# Patient Record
Sex: FEMALE | Race: OTHER
[De-identification: names, ages, dates, MRNs, and addresses within clinical notes are randomized per-mention and may not be internally consistent; named-entity substitution may affect disease eponyms.]

---

## 2018-08-16 ENCOUNTER — HOSPITAL ENCOUNTER (INPATIENT)
Dept: HOSPITAL 72 - EMR | Age: 83
LOS: 4 days | Discharge: HOME | DRG: 917 | End: 2018-08-20
Payer: COMMERCIAL

## 2018-08-16 VITALS — SYSTOLIC BLOOD PRESSURE: 143 MMHG | DIASTOLIC BLOOD PRESSURE: 87 MMHG

## 2018-08-16 VITALS — HEIGHT: 62 IN | WEIGHT: 139 LBS | BODY MASS INDEX: 25.58 KG/M2

## 2018-08-16 DIAGNOSIS — I45.10: ICD-10-CM

## 2018-08-16 DIAGNOSIS — E83.42: ICD-10-CM

## 2018-08-16 DIAGNOSIS — E87.6: ICD-10-CM

## 2018-08-16 DIAGNOSIS — T42.6X1A: Primary | ICD-10-CM

## 2018-08-16 DIAGNOSIS — Z60.2: ICD-10-CM

## 2018-08-16 DIAGNOSIS — F51.04: ICD-10-CM

## 2018-08-16 DIAGNOSIS — F32.9: ICD-10-CM

## 2018-08-16 DIAGNOSIS — E87.1: ICD-10-CM

## 2018-08-16 DIAGNOSIS — I10: ICD-10-CM

## 2018-08-16 DIAGNOSIS — E03.9: ICD-10-CM
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
## 2018-08-16 DIAGNOSIS — F41.0: ICD-10-CM

## 2018-08-16 DIAGNOSIS — G92: ICD-10-CM

## 2018-08-16 LAB
ADD MANUAL DIFF: NO
ALBUMIN SERPL-MCNC: 3.7 G/DL (ref 3.4–5)
ALBUMIN/GLOB SERPL: 1.2 {RATIO} (ref 1–2.7)
ALP SERPL-CCNC: 63 U/L (ref 46–116)
ALT SERPL-CCNC: 20 U/L (ref 12–78)
AMMONIA PLAS-SCNC: 18 UMOL/L (ref 11–32)
ANION GAP SERPL CALC-SCNC: 8 MMOL/L (ref 5–15)
AST SERPL-CCNC: 14 U/L (ref 15–37)
BASOPHILS NFR BLD AUTO: 1.4 % (ref 0–2)
BILIRUB SERPL-MCNC: 0.6 MG/DL (ref 0.2–1)
BUN SERPL-MCNC: 15 MG/DL (ref 7–18)
CALCIUM SERPL-MCNC: 9.6 MG/DL (ref 8.5–10.1)
CHLORIDE SERPL-SCNC: 95 MMOL/L (ref 98–107)
CO2 SERPL-SCNC: 28 MMOL/L (ref 21–32)
CREAT SERPL-MCNC: 0.7 MG/DL (ref 0.55–1.3)
EOSINOPHIL NFR BLD AUTO: 1 % (ref 0–3)
ERYTHROCYTE [DISTWIDTH] IN BLOOD BY AUTOMATED COUNT: 12.3 % (ref 11.6–14.8)
GLOBULIN SER-MCNC: 3.2 G/DL
HCT VFR BLD CALC: 38.1 % (ref 37–47)
HGB BLD-MCNC: 13 G/DL (ref 12–16)
LYMPHOCYTES NFR BLD AUTO: 24.1 % (ref 20–45)
MCV RBC AUTO: 87 FL (ref 80–99)
MONOCYTES NFR BLD AUTO: 8.9 % (ref 1–10)
NEUTROPHILS NFR BLD AUTO: 64.6 % (ref 45–75)
PLATELET # BLD: 207 K/UL (ref 150–450)
POTASSIUM SERPL-SCNC: 2.6 MMOL/L (ref 3.5–5.1)
RBC # BLD AUTO: 4.39 M/UL (ref 4.2–5.4)
SODIUM SERPL-SCNC: 131 MMOL/L (ref 136–145)
WBC # BLD AUTO: 5.6 K/UL (ref 4.8–10.8)

## 2018-08-16 PROCEDURE — 80329 ANALGESICS NON-OPIOID 1 OR 2: CPT

## 2018-08-16 PROCEDURE — 71045 X-RAY EXAM CHEST 1 VIEW: CPT

## 2018-08-16 PROCEDURE — 80307 DRUG TEST PRSMV CHEM ANLYZR: CPT

## 2018-08-16 PROCEDURE — 84443 ASSAY THYROID STIM HORMONE: CPT

## 2018-08-16 PROCEDURE — 81003 URINALYSIS AUTO W/O SCOPE: CPT

## 2018-08-16 PROCEDURE — 70450 CT HEAD/BRAIN W/O DYE: CPT

## 2018-08-16 PROCEDURE — 93971 EXTREMITY STUDY: CPT

## 2018-08-16 PROCEDURE — 80053 COMPREHEN METABOLIC PANEL: CPT

## 2018-08-16 PROCEDURE — 96360 HYDRATION IV INFUSION INIT: CPT

## 2018-08-16 PROCEDURE — 82140 ASSAY OF AMMONIA: CPT

## 2018-08-16 PROCEDURE — 85025 COMPLETE CBC W/AUTO DIFF WBC: CPT

## 2018-08-16 PROCEDURE — 84484 ASSAY OF TROPONIN QUANT: CPT

## 2018-08-16 PROCEDURE — 83605 ASSAY OF LACTIC ACID: CPT

## 2018-08-16 PROCEDURE — 80048 BASIC METABOLIC PNL TOTAL CA: CPT

## 2018-08-16 PROCEDURE — 93005 ELECTROCARDIOGRAM TRACING: CPT

## 2018-08-16 PROCEDURE — 83735 ASSAY OF MAGNESIUM: CPT

## 2018-08-16 PROCEDURE — 36415 COLL VENOUS BLD VENIPUNCTURE: CPT

## 2018-08-16 SDOH — SOCIAL STABILITY - SOCIAL INSECURITY: PROBLEMS RELATED TO LIVING ALONE: Z60.2

## 2018-08-16 NOTE — EMERGENCY ROOM REPORT
History of Present Illness


General


Source:  Family Member, EMS





Present Illness


HPI


Patient is an 89-year-old female brought in by [facility] after 

increased altered mental status.  Patient had been reportedly had an argument 

with her son and may have taken some sleeping pills.  Patient prior history of 

hypothyroidism as well as hypertension.  Patient was noted to have increased 

altered mental status.Patient reportedly had been at a doctor's appointment 

earlier in the day.The patient was noted to be responsive to painful stimuli by 

EMS.


Allergies:  


Coded Allergies:  


     CODEINE (Verified  Allergy, Unknown, 10/30/09)





Patient History


Reviewed Nursing Documentation:  PMH: Agreed; PSxH: Agreed





Review of Systems


All Other Systems:  limited - by mental status





Physical Exam


General Appearance:  moderate distress, lethargic


Head:  normocephalic


Eyes:  bilateral eye other - pupils 2mm reactive


ENT:  dry mucus membranes


Neck:  limited range of motion


Respiratory:  rales


Cardiovascular #1:  tachycardia


Gastrointestinal:  normal inspection, soft


Musculoskeletal:  normal inspection, back normal


Neurologic:  other - somnolent, purposeful movement when checking gag reflex





Medical Decision Making


Diagnostic Impression:  


 Primary Impression:  


 Altered level of consciousness


 Additional Impressions:  


 Incomplete right bundle branch block


 Medication overdose


ER Course


Patient presented for altered mental status.  Differential diagnosis included 

wasn't limited to CVA, medication overdose, sepsis, hypertensive crisis, 

encephalopathy among others.Because of complexity of patient's case laboratory 

testing and imaging studies were ordered.A CT imaging of the head read by 

radiology showed no evidence of acute hemorrhage or CVA.EKG interpreted by me 

showed normal sinus rhythm with a rate of 75 with left axis deviation right 

bundle-branch block without acute ST changes.  The patient started on IV 

fluids.  Chest x-ray one view interpreted by me showed tortuous aorta without 

evident infiltrate normal cardiac size and probable healed left proximal 

humerus fracture.  


The patient was noted to have some gradual improvement in mental status.  

Poison control was contacted for possible overdose.  They recommended 

laboratory testing as well as IV bicarbonate.  Dr. Chicas was contacted for 

inpatient management due to panel physician.





Labs








Test


  8/16/18


22:40 8/17/18


01:50


 


White Blood Count


  5.6 K/UL


(4.8-10.8) 


 


 


Red Blood Count


  4.39 M/UL


(4.20-5.40) 


 


 


Hemoglobin


  13.0 G/DL


(12.0-16.0) 


 


 


Hematocrit


  38.1 %


(37.0-47.0) 


 


 


Mean Corpuscular Volume 87 FL (80-99)  


 


Mean Corpuscular Hemoglobin


  29.7 PG


(27.0-31.0) 


 


 


Mean Corpuscular Hemoglobin


Concent 34.2 G/DL


(32.0-36.0) 


 


 


Red Cell Distribution Width


  12.3 %


(11.6-14.8) 


 


 


Platelet Count


  207 K/UL


(150-450) 


 


 


Mean Platelet Volume


  8.9 FL


(6.5-10.1) 


 


 


Neutrophils (%) (Auto)


  64.6 %


(45.0-75.0) 


 


 


Lymphocytes (%) (Auto)


  24.1 %


(20.0-45.0) 


 


 


Monocytes (%) (Auto)


  8.9 %


(1.0-10.0) 


 


 


Eosinophils (%) (Auto)


  1.0 %


(0.0-3.0) 


 


 


Basophils (%) (Auto)


  1.4 %


(0.0-2.0) 


 


 


Sodium Level


  131 MMOL/L


(136-145) 


 


 


Potassium Level


  2.6 MMOL/L


(3.5-5.1) 


 


 


Chloride Level


  95 MMOL/L


() 


 


 


Carbon Dioxide Level


  28 MMOL/L


(21-32) 


 


 


Anion Gap


  8 mmol/L


(5-15) 


 


 


Blood Urea Nitrogen


  15 mg/dL


(7-18) 


 


 


Creatinine


  0.7 MG/DL


(0.55-1.30) 


 


 


Estimat Glomerular Filtration


Rate  mL/min (>60) 


  


 


 


Glucose Level


  116 MG/DL


() 


 


 


Lactic Acid Level


  0.90 mmol/L


(0.4-2.0) 


 


 


Calcium Level


  9.6 MG/DL


(8.5-10.1) 


 


 


Total Bilirubin


  0.6 MG/DL


(0.2-1.0) 


 


 


Aspartate Amino Transf


(AST/SGOT) 14 U/L (15-37) 


  


 


 


Alanine Aminotransferase


(ALT/SGPT) 20 U/L (12-78) 


  


 


 


Alkaline Phosphatase


  63 U/L


() 


 


 


Ammonia


  18 umol/L


(11-32) 


 


 


Troponin I


  0.003 ng/mL


(0.000-0.056) 


 


 


Total Protein


  6.9 G/DL


(6.4-8.2) 


 


 


Albumin


  3.7 G/DL


(3.4-5.0) 


 


 


Globulin 3.2 g/dL  


 


Albumin/Globulin Ratio 1.2 (1.0-2.7)  


 


Thyroid Stimulating Hormone


(TSH) 4.465 uiU/mL


(0.358-3.740) 


 


 


Salicylates Level


  0.7 ug/mL


(2.8-20) 


 


 


Acetaminophen Level


  < 2 MCG/ML


(10-30) 


 


 


Serum Alcohol < 3 mg/dL  


 


Urine Color  Pale yellow 


 


Urine Appearance  Clear 


 


Urine pH  7 (4.5-8.0) 


 


Urine Specific Gravity


  


  1.010


(1.005-1.035)


 


Urine Protein


  


  Negative


(NEGATIVE)


 


Urine Glucose (UA)


  


  Negative


(NEGATIVE)


 


Urine Ketones  1+ (NEGATIVE) 


 


Urine Occult Blood  1+ (NEGATIVE) 


 


Urine Nitrite


  


  Negative


(NEGATIVE)


 


Urine Bilirubin


  


  Negative


(NEGATIVE)


 


Urine Urobilinogen


  


  Normal MG/DL


(0.0-1.0)


 


Urine Leukocyte Esterase


  


  Negative


(NEGATIVE)


 


Urine RBC


  


  0-2 /HPF (0 -


2)


 


Urine WBC  0 /HPF (0 - 2) 


 


Urine Squamous Epithelial


Cells 


  Few /LPF


(NONE/OCC)


 


Urine Bacteria


  


  None /HPF


(NONE)


 


Urine Opiates Screen


  


  Negative


(NEGATIVE)


 


Urine Barbiturates Screen


  


  Negative


(NEGATIVE)


 


Phencyclidine (PCP) Screen


  


  Negative


(NEGATIVE)


 


Urine Amphetamines Screen


  


  Negative


(NEGATIVE)


 


Urine Benzodiazepines Screen


  


  Negative


(NEGATIVE)


 


Urine Cocaine Screen


  


  Negative


(NEGATIVE)


 


Urine Marijuana (THC) Screen


  


  Negative


(NEGATIVE)








EKG Diagnostic Results


Rate:  normal


Rhythm:  NSR


ST Segments:  no acute changes


Status:  unchanged


Disposition:  ADMITTED AS INPATIENT


Condition:  John Sosa MD Aug 16, 2018 22:22

## 2018-08-17 VITALS — DIASTOLIC BLOOD PRESSURE: 73 MMHG | SYSTOLIC BLOOD PRESSURE: 154 MMHG

## 2018-08-17 VITALS — SYSTOLIC BLOOD PRESSURE: 123 MMHG | DIASTOLIC BLOOD PRESSURE: 75 MMHG

## 2018-08-17 VITALS — DIASTOLIC BLOOD PRESSURE: 67 MMHG | SYSTOLIC BLOOD PRESSURE: 130 MMHG

## 2018-08-17 VITALS — DIASTOLIC BLOOD PRESSURE: 77 MMHG | SYSTOLIC BLOOD PRESSURE: 136 MMHG

## 2018-08-17 VITALS — SYSTOLIC BLOOD PRESSURE: 173 MMHG | DIASTOLIC BLOOD PRESSURE: 90 MMHG

## 2018-08-17 VITALS — DIASTOLIC BLOOD PRESSURE: 108 MMHG | SYSTOLIC BLOOD PRESSURE: 142 MMHG

## 2018-08-17 VITALS — DIASTOLIC BLOOD PRESSURE: 84 MMHG | SYSTOLIC BLOOD PRESSURE: 144 MMHG

## 2018-08-17 VITALS — DIASTOLIC BLOOD PRESSURE: 111 MMHG | SYSTOLIC BLOOD PRESSURE: 187 MMHG

## 2018-08-17 VITALS — SYSTOLIC BLOOD PRESSURE: 147 MMHG | DIASTOLIC BLOOD PRESSURE: 61 MMHG

## 2018-08-17 VITALS — SYSTOLIC BLOOD PRESSURE: 143 MMHG | DIASTOLIC BLOOD PRESSURE: 80 MMHG

## 2018-08-17 LAB
ADD MANUAL DIFF: NO
ALBUMIN SERPL-MCNC: 3.5 G/DL (ref 3.4–5)
ALBUMIN/GLOB SERPL: 1 {RATIO} (ref 1–2.7)
ALP SERPL-CCNC: 62 U/L (ref 46–116)
ALT SERPL-CCNC: 13 U/L (ref 12–78)
ANION GAP SERPL CALC-SCNC: 6 MMOL/L (ref 5–15)
APPEARANCE UR: CLEAR
APTT PPP: (no result) S
AST SERPL-CCNC: 18 U/L (ref 15–37)
BASOPHILS NFR BLD AUTO: 0.8 % (ref 0–2)
BILIRUB SERPL-MCNC: 0.7 MG/DL (ref 0.2–1)
BUN SERPL-MCNC: 14 MG/DL (ref 7–18)
CALCIUM SERPL-MCNC: 8.6 MG/DL (ref 8.5–10.1)
CHLORIDE SERPL-SCNC: 96 MMOL/L (ref 98–107)
CO2 SERPL-SCNC: 28 MMOL/L (ref 21–32)
CREAT SERPL-MCNC: 0.7 MG/DL (ref 0.55–1.3)
EOSINOPHIL NFR BLD AUTO: 0.3 % (ref 0–3)
ERYTHROCYTE [DISTWIDTH] IN BLOOD BY AUTOMATED COUNT: 11.9 % (ref 11.6–14.8)
GLOBULIN SER-MCNC: 3.5 G/DL
GLUCOSE UR STRIP-MCNC: NEGATIVE MG/DL
HCT VFR BLD CALC: 41.4 % (ref 37–47)
HGB BLD-MCNC: 13.9 G/DL (ref 12–16)
KETONES UR QL STRIP: (no result)
LEUKOCYTE ESTERASE UR QL STRIP: NEGATIVE
LYMPHOCYTES NFR BLD AUTO: 10.6 % (ref 20–45)
MCV RBC AUTO: 86 FL (ref 80–99)
MONOCYTES NFR BLD AUTO: 6.8 % (ref 1–10)
NEUTROPHILS NFR BLD AUTO: 81.6 % (ref 45–75)
NITRITE UR QL STRIP: NEGATIVE
PH UR STRIP: 7 [PH] (ref 4.5–8)
PLATELET # BLD: 201 K/UL (ref 150–450)
POTASSIUM SERPL-SCNC: 4 MMOL/L (ref 3.5–5.1)
PROT UR QL STRIP: NEGATIVE
RBC # BLD AUTO: 4.82 M/UL (ref 4.2–5.4)
SODIUM SERPL-SCNC: 130 MMOL/L (ref 136–145)
SP GR UR STRIP: 1.01 (ref 1–1.03)
UROBILINOGEN UR-MCNC: NORMAL MG/DL (ref 0–1)
WBC # BLD AUTO: 9 K/UL (ref 4.8–10.8)

## 2018-08-17 NOTE — DIAGNOSTIC IMAGING REPORT
Indications: Altered mental status

 

Technique: Spiral acquisitions obtained through the brain. Angled axial and coronal 5

x 5 mm slices were reconstructed. Total dose length product 1428.87 mGycm.  CTDI

vol(s) 70.38 mGy. Dose reduction achieved using automated exposure control

 

Comparison: None.

 

Findings: There is some image degradation due to motion artifact. No acute

intracranial hemorrhage or edema. No mass effect nor midline shift. There is

age-related enlargement of the ventricles and extra-axial CSF spaces. There is

periventricular deep white matter low-attenuation. Old lacunar infarct is seen in the

right frontal deep white matter. There is evidence of prior bilateral cataract

surgery. There is minimal mastoid disease on the left. The calvarium is intact. The

sinuses are clear

 

Impression: Somewhat limited exam due to motion artifact

 

Chronic and age-related changes

 

Negative for acute intracranial bleed or mass effect

 

Minimal mastoid disease

 

This agrees with the preliminary interpretation provided overnight by [facility]

teleradiology service.

 

The CT scanner at [facility] is accredited by the [employer] College of

Radiology and the scans are performed using protocols designed to limit radiation

exposure to as low as reasonably achievable to attain images of sufficient resolution

adequate for diagnostic evaluation.

## 2018-08-17 NOTE — DIAGNOSTIC IMAGING REPORT
Indication: Shortness of breath

 

Technique: One view of the chest

 

Comparison: none

 

Findings: There is old healed fracture deformity left humerus. The lungs and pleural

spaces are clear. The heart size is upper limits normal. The aorta is tortuous

ectatic and calcified. There are surgical clips in the right upper quadrant. There is

evidence of prior vertebral augmentation procedure

 

Impression: No acute process

## 2018-08-17 NOTE — CONSULTATION
History of Present Illness


General


Date patient seen:  Aug 17, 2018


Chief Complaint:  Altered Level of Consciousness





Present Illness


HPI


89 y/o w/ HTN, depression, wet macular degeneration, chronic insomnia admitted 

with altered mental status and apparently took more than one ambien for 

possible suicidal ideation.  Noted electrolyte abnormalities but currently 

alert and comfortable.  Depressed about her overall health and condition.  No 

chest pain or shortness of breath.  No fever or chills.  Still w/ suicidal 

ideation per her discussion with others.


Allergies:  


Coded Allergies:  


     CODEINE (Verified  Allergy, Unknown, 10/30/09)





Medication History


Scheduled


Aspirin* (Aspir 81*), 81 MG ORAL DAILY, (Reported)





Miscellaneous Medications


Hydrochlorothiazide* (Hydrochlorothiazide*), MG ORAL, (Reported)


Levothyroxine Sodium* (Levothyroxine Sodium*), MCG ORAL, (Reported)


Losartan Potassium* (Losartan Potassium*), MG ORAL, (Reported)


Propranolol Hcl (Propranolol Hcl), MG PO, (Reported)


Simvastatin (Zocor), MG ORAL, (Reported)


Zolpidem Tartrate* (Zolpidem Tartrate*), MG ORAL, (Reported)





Patient History


History Provided By:  Patient


Healthcare decision maker





Resuscitation status


Full Code


Advanced Directive on File








Past Medical/Surgical History


Past Medical/Surgical History:  


(1) Hypertension


(2) Hypothyroidism





Review of Systems


Constitutional:  Reports: no symptoms


Eye:  Reports: other - wet macular degeneration


ENT:  Reports: no symptoms


Respiratory:  Reports: no symptoms


Cardiovascular:  Reports: no symptoms


Gastrointestinal:  Reports: no symptoms


Musculoskeletal:  Reports: no symptoms


Psychiatric:  Reports: depressed feelings


Neurological:  Reports: no symptoms





Physical Exam


General Appearance:  WD/WN, no apparent distress, alert


HEENT:  normocephalic, atraumatic, anicteric


Neck:  supple, normal inspection


Respiratory/Chest:  lungs clear


Cardiovascular/Chest:  normal rate, regular rhythm


Abdomen:  normal bowel sounds, non tender, soft


Extremities:  no edema


Neurologic:  CNs II-XII grossly normal, no motor/sensory deficits, alert, 

oriented x 3, responsive





Last 24 Hour Vital Signs








  Date Time  Temp Pulse Resp B/P (MAP) Pulse Ox O2 Delivery O2 Flow Rate FiO2


 


8/17/18 13:39    130/67    


 


8/17/18 12:00  80      


 


8/17/18 12:00      Room Air  


 


8/17/18 12:00 97.5 73 18 130/67 (88) 96   





 97.5       


 


8/17/18 08:03  81      


 


8/17/18 08:00      Room Air  


 


8/17/18 08:00      Room Air  


 


8/17/18 08:00 97.5 77 18 147/61 (89) 94   





 97.5       


 


8/17/18 06:30 97.5 75 16 154/73 (100) 92   





 97.5       


 


8/17/18 06:24 97.0 73 28 123/75 96 Nasal Cannula 2.0 





 97.0       


 


8/17/18 06:03 97.0 73 28 123/75 96 Nasal Cannula 2.0 





 97.0       


 


8/17/18 05:50 97.0 77 28 173/90 96 Nasal Cannula 2.0 





 97.0       


 


8/17/18 05:28 97.0 80 28 142/108 96 Nasal Cannula 2.0 





 97.0       


 


8/17/18 05:21  94  171/111    


 


8/17/18 03:51 97.0 79 35 187/111 96 Nasal Cannula 2.0 





 97.0       


 


8/17/18 02:12  73 23 136/77 96 Nasal Cannula 2.0 


 


8/16/18 22:30  75 30 143/87 95 Nasal Cannula 2.0 


 


8/16/18 22:25  76 23 180/103 98 Ambu-Bag 10.0 

















Intake and Output  


 


 8/16/18 8/17/18





 19:00 07:00


 


Intake Total  1200 ml


 


Output Total  600 ml


 


Balance  600 ml


 


  


 


Intake IV Total  1200 ml


 


Output Urine Total  600 ml











Laboratory Tests








Test


  8/16/18


22:40 8/17/18


01:50 8/17/18


03:45


 


White Blood Count


  5.6 K/UL


(4.8-10.8) 


  9.0 K/UL


(4.8-10.8)  #


 


Red Blood Count


  4.39 M/UL


(4.20-5.40) 


  4.82 M/UL


(4.20-5.40)


 


Hemoglobin


  13.0 G/DL


(12.0-16.0) 


  13.9 G/DL


(12.0-16.0)


 


Hematocrit


  38.1 %


(37.0-47.0) 


  41.4 %


(37.0-47.0)


 


Mean Corpuscular Volume 87 FL (80-99)    86 FL (80-99)  


 


Mean Corpuscular Hemoglobin


  29.7 PG


(27.0-31.0) 


  28.9 PG


(27.0-31.0)


 


Mean Corpuscular Hemoglobin


Concent 34.2 G/DL


(32.0-36.0) 


  33.6 G/DL


(32.0-36.0)


 


Red Cell Distribution Width


  12.3 %


(11.6-14.8) 


  11.9 %


(11.6-14.8)


 


Platelet Count


  207 K/UL


(150-450) 


  201 K/UL


(150-450)


 


Mean Platelet Volume


  8.9 FL


(6.5-10.1) 


  8.7 FL


(6.5-10.1)


 


Neutrophils (%) (Auto)


  64.6 %


(45.0-75.0) 


  81.6 %


(45.0-75.0)  H


 


Lymphocytes (%) (Auto)


  24.1 %


(20.0-45.0) 


  10.6 %


(20.0-45.0)  L


 


Monocytes (%) (Auto)


  8.9 %


(1.0-10.0) 


  6.8 %


(1.0-10.0)


 


Eosinophils (%) (Auto)


  1.0 %


(0.0-3.0) 


  0.3 %


(0.0-3.0)


 


Basophils (%) (Auto)


  1.4 %


(0.0-2.0) 


  0.8 %


(0.0-2.0)


 


Sodium Level


  131 MMOL/L


(136-145)  L 


  130 MMOL/L


(136-145)  L


 


Potassium Level


  2.6 MMOL/L


(3.5-5.1)  *L 


  4.0 MMOL/L


(3.5-5.1)  #


 


Chloride Level


  95 MMOL/L


()  L 


  96 MMOL/L


()  L


 


Carbon Dioxide Level


  28 MMOL/L


(21-32) 


  28 MMOL/L


(21-32)


 


Anion Gap


  8 mmol/L


(5-15) 


  6 mmol/L


(5-15)


 


Blood Urea Nitrogen


  15 mg/dL


(7-18) 


  14 mg/dL


(7-18)


 


Creatinine


  0.7 MG/DL


(0.55-1.30) 


  0.7 MG/DL


(0.55-1.30)


 


Estimat Glomerular Filtration


Rate  mL/min (>60)  


  


   mL/min (>60)  


 


 


Glucose Level


  116 MG/DL


()  H 


  122 MG/DL


()  H


 


Lactic Acid Level


  0.90 mmol/L


(0.4-2.0) 


  


 


 


Calcium Level


  9.6 MG/DL


(8.5-10.1) 


  8.6 MG/DL


(8.5-10.1)


 


Total Bilirubin


  0.6 MG/DL


(0.2-1.0) 


  0.7 MG/DL


(0.2-1.0)


 


Aspartate Amino Transf


(AST/SGOT) 14 U/L (15-37)


L 


  18 U/L (15-37)


 


 


Alanine Aminotransferase


(ALT/SGPT) 20 U/L (12-78)


  


  13 U/L (12-78)


 


 


Alkaline Phosphatase


  63 U/L


() 


  62 U/L


()


 


Ammonia


  18 umol/L


(11-32) 


  


 


 


Troponin I


  0.003 ng/mL


(0.000-0.056) 


  


 


 


Total Protein


  6.9 G/DL


(6.4-8.2) 


  7.0 G/DL


(6.4-8.2)


 


Albumin


  3.7 G/DL


(3.4-5.0) 


  3.5 G/DL


(3.4-5.0)


 


Globulin 3.2 g/dL    3.5 g/dL  


 


Albumin/Globulin Ratio 1.2 (1.0-2.7)    1.0 (1.0-2.7)  


 


Thyroid Stimulating Hormone


(TSH) 4.465 uiU/mL


(0.358-3.740) 


  


 


 


Salicylates Level


  0.7 ug/mL


(2.8-20)  L 


  0.2 ug/mL


(2.8-20)  L


 


Acetaminophen Level


  < 2 MCG/ML


(10-30)  L 


  < 2 MCG/ML


(10-30)  L


 


Serum Alcohol < 3 mg/dL    


 


Urine Color  Pale yellow   


 


Urine Appearance  Clear   


 


Urine pH  7 (4.5-8.0)   


 


Urine Specific Gravity


  


  1.010


(1.005-1.035) 


 


 


Urine Protein


  


  Negative


(NEGATIVE) 


 


 


Urine Glucose (UA)


  


  Negative


(NEGATIVE) 


 


 


Urine Ketones


  


  1+ (NEGATIVE)


H 


 


 


Urine Occult Blood


  


  1+ (NEGATIVE)


H 


 


 


Urine Nitrite


  


  Negative


(NEGATIVE) 


 


 


Urine Bilirubin


  


  Negative


(NEGATIVE) 


 


 


Urine Urobilinogen


  


  Normal MG/DL


(0.0-1.0) 


 


 


Urine Leukocyte Esterase


  


  Negative


(NEGATIVE) 


 


 


Urine RBC


  


  0-2 /HPF (0 -


2) 


 


 


Urine WBC


  


  0 /HPF (0 - 2)


  


 


 


Urine Squamous Epithelial


Cells 


  Few /LPF


(NONE/OCC) 


 


 


Urine Bacteria


  


  None /HPF


(NONE) 


 


 


Urine Opiates Screen


  


  Negative


(NEGATIVE) 


 


 


Urine Barbiturates Screen


  


  Negative


(NEGATIVE) 


 


 


Phencyclidine (PCP) Screen


  


  Negative


(NEGATIVE) 


 


 


Urine Amphetamines Screen


  


  Negative


(NEGATIVE) 


 


 


Urine Benzodiazepines Screen


  


  Negative


(NEGATIVE) 


 


 


Urine Cocaine Screen


  


  Negative


(NEGATIVE) 


 


 


Urine Marijuana (THC) Screen


  


  Negative


(NEGATIVE) 


 


 


Magnesium Level


  


  


  1.5 MG/DL


(1.8-2.4)  L








Height (Feet):  5


Height (Inches):  2.00


Weight (Pounds):  139


Medications





Current Medications








 Medications


  (Trade)  Dose


 Ordered  Sig/Waqar


 Route


 PRN Reason  Start Time


 Stop Time Status Last Admin


Dose Admin


 


 Acetaminophen


  (Tylenol)  500 mg  Q4H  PRN


 ORAL


 Mild Pain (Pain Scale 1-3)  8/17/18 08:30


 9/16/18 08:29  8/17/18 09:57


 


 


 Aspirin


  (ASA)  81 mg  DAILY


 ORAL


   8/18/18 09:00


 9/17/18 08:59   


 


 


 Atorvastatin


 Calcium


  (Lipitor)  10 mg  BEDTIME


 ORAL


   8/17/18 21:00


 9/16/18 20:59   


 


 


 Hydrochlorothiazide


  (Hydrodiuril)  12.5 mg  DAILY


 ORAL


   8/18/18 09:00


 9/17/18 08:59   


 


 


 Hydrochlorothiazide


  (Hydrodiuril)  12.5 mg  ONCE


 ORAL


   8/17/18 15:00


 8/17/18 16:30   


 


 


 Levothyroxine


 Sodium


  (Synthroid)  125 mcg  DAILY@0630


 ORAL


   8/18/18 06:30


 9/17/18 06:29   


 


 


 Losartan Potassium


  (Cozaar)  25 mg  DAILY


 ORAL


   8/18/18 09:00


 9/17/18 08:59   


 


 


 Propranolol HCl


  (Inderal)  20 mg  DAILY@1500


 ORAL


   8/17/18 15:00


 9/16/18 14:59   


 


 


 Sodium Chloride  1,000 ml @ 


 75 mls/hr  [id number]


 IV


   8/17/18 08:00


 9/16/18 07:59  8/17/18 09:22


 











Assessment/Plan


Problem List:  


(1) Altered level of consciousness


ICD Codes:  R40.4 - Transient alteration of awareness


SNOMED:  3534050


(2) Incomplete right bundle branch block


ICD Codes:  I45.10 - Unspecified right bundle-branch block


SNOMED:  638500550


(3) Hypokalemia


ICD Codes:  E87.6 - Hypokalemia


SNOMED:  24227874


(4) Hypomagnesemia


ICD Codes:  E83.42 - Hypomagnesemia


SNOMED:  725424549


(5) Hyponatremia


ICD Codes:  E87.1 - Hypo-osmolality and hyponatremia


SNOMED:  53879262


(6) Hypothyroidism


ICD Codes:  E03.9 - Hypothyroidism, unspecified


SNOMED:  34191653


(7) Hypertension


ICD Codes:  I10 - Essential (primary) hypertension


SNOMED:  86013211


(8) Medication overdose


ICD Codes:  T50.901A - Poisoning by unspecified drugs, medicaments and 

biological substances, accidental (unintentional), initial encounter


SNOMED:  31482579


Assessment/Plan


Plan:


-respiratory status stable


-psych consult


-5150 hold


-avoid further ambien, can consider melatonin





Will sign off, please call with any questions





Time: 65 min











MICHELLE CAMACHO Aug 17, 2018 16:04

## 2018-08-17 NOTE — HISTORY AND PHYSICAL
History of Present Illness


General


Date patient seen:  Aug 17, 2018


Reason for Hospitalization:  Altered Level of Consciousness





Present Illness


HPI


88 y/o female with a PMH of HTN, hypothyroidism, and depression presents from 

home after overdosing on sleeping medications (zolpidem). Patient states that 

she has been having suicidal ideations for some time and yesterday got into an 

argument with her son and then decided to overdose on her sleeping medications. 

Patient was brought in by EMS and was acutely altered in the ER. CT head was 

done, which was unremarkable. Today, patient is A&O x 4 and in no acute 

distress. Patient continues to report suicidal ideations and states that she 

may do it again. Sitter is present at bedside in room. Denies cp, sob, n/v, 

abdominal pain.


Allergies:  


Coded Allergies:  


     CODEINE (Verified  Allergy, Unknown, 10/30/09)





Medication History


Miscellaneous Medications


Hydrochlorothiazide* (Hydrochlorothiazide*), MG ORAL, (Reported)


Levothyroxine Sodium* (Levothyroxine Sodium*), MCG ORAL, (Reported)


Losartan Potassium* (Losartan Potassium*), MG ORAL, (Reported)


Propranolol Hcl (Propranolol Hcl), MG PO, (Reported)


Simvastatin (Zocor), MG ORAL, (Reported)


Zolpidem Tartrate* (Zolpidem Tartrate*), MG ORAL, (Reported)





Patient History


History Provided By:  Patient, Medical Record


Healthcare decision maker





Resuscitation status


Full Code


Advanced Directive on File








Review of Systems


All Other Systems:  negative except mentioned in HPI





Physical Exam


General Appearance:  no apparent distress, alert


HEENT:  normocephalic, atraumatic


Neck:  non-tender, normal alignment, supple


Respiratory/Chest:  chest wall non-tender, lungs clear, normal breath sounds


Cardiovascular/Chest:  normal peripheral pulses, normal rate, regular rhythm


Abdomen:  normal bowel sounds, non tender, soft


Extremities:  normal range of motion, non-tender


Skin Exam:  normal pigmentation, warm/dry


Neurologic:  CNs II-XII grossly normal, no motor/sensory deficits, alert, 

oriented x 3





Last 24 Hour Vital Signs








  Date Time  Temp Pulse Resp B/P (MAP) Pulse Ox O2 Delivery O2 Flow Rate FiO2


 


8/17/18 13:39    130/67    


 


8/17/18 08:03  81      


 


8/17/18 08:00      Room Air  


 


8/17/18 08:00 97.5 77 18 147/61 (89) 94   





 97.5       


 


8/17/18 06:30 97.5 75 16 154/73 (100) 92   





 97.5       


 


8/17/18 06:24 97.0 73 28 123/75 96 Nasal Cannula 2.0 





 97.0       


 


8/17/18 06:03 97.0 73 28 123/75 96 Nasal Cannula 2.0 





 97.0       


 


8/17/18 05:50 97.0 77 28 173/90 96 Nasal Cannula 2.0 





 97.0       


 


8/17/18 05:28 97.0 80 28 142/108 96 Nasal Cannula 2.0 





 97.0       


 


8/17/18 05:21  94  171/111    


 


8/17/18 03:51 97.0 79 35 187/111 96 Nasal Cannula 2.0 





 97.0       


 


8/17/18 02:12  73 23 136/77 96 Nasal Cannula 2.0 


 


8/16/18 22:30  75 30 143/87 95 Nasal Cannula 2.0 


 


8/16/18 22:25  76 23 180/103 98 Ambu-Bag 10.0 

















Intake and Output  


 


 8/16/18 8/17/18





 19:00 07:00


 


Intake Total  1200 ml


 


Output Total  600 ml


 


Balance  600 ml


 


  


 


Intake IV Total  1200 ml


 


Output Urine Total  600 ml











Laboratory Tests








Test


  8/16/18


22:40 8/17/18


01:50 8/17/18


03:45


 


White Blood Count


  5.6 K/UL


(4.8-10.8) 


  9.0 K/UL


(4.8-10.8)  #


 


Red Blood Count


  4.39 M/UL


(4.20-5.40) 


  4.82 M/UL


(4.20-5.40)


 


Hemoglobin


  13.0 G/DL


(12.0-16.0) 


  13.9 G/DL


(12.0-16.0)


 


Hematocrit


  38.1 %


(37.0-47.0) 


  41.4 %


(37.0-47.0)


 


Mean Corpuscular Volume 87 FL (80-99)    86 FL (80-99)  


 


Mean Corpuscular Hemoglobin


  29.7 PG


(27.0-31.0) 


  28.9 PG


(27.0-31.0)


 


Mean Corpuscular Hemoglobin


Concent 34.2 G/DL


(32.0-36.0) 


  33.6 G/DL


(32.0-36.0)


 


Red Cell Distribution Width


  12.3 %


(11.6-14.8) 


  11.9 %


(11.6-14.8)


 


Platelet Count


  207 K/UL


(150-450) 


  201 K/UL


(150-450)


 


Mean Platelet Volume


  8.9 FL


(6.5-10.1) 


  8.7 FL


(6.5-10.1)


 


Neutrophils (%) (Auto)


  64.6 %


(45.0-75.0) 


  81.6 %


(45.0-75.0)  H


 


Lymphocytes (%) (Auto)


  24.1 %


(20.0-45.0) 


  10.6 %


(20.0-45.0)  L


 


Monocytes (%) (Auto)


  8.9 %


(1.0-10.0) 


  6.8 %


(1.0-10.0)


 


Eosinophils (%) (Auto)


  1.0 %


(0.0-3.0) 


  0.3 %


(0.0-3.0)


 


Basophils (%) (Auto)


  1.4 %


(0.0-2.0) 


  0.8 %


(0.0-2.0)


 


Sodium Level


  131 MMOL/L


(136-145)  L 


  130 MMOL/L


(136-145)  L


 


Potassium Level


  2.6 MMOL/L


(3.5-5.1)  *L 


  4.0 MMOL/L


(3.5-5.1)  #


 


Chloride Level


  95 MMOL/L


()  L 


  96 MMOL/L


()  L


 


Carbon Dioxide Level


  28 MMOL/L


(21-32) 


  28 MMOL/L


(21-32)


 


Anion Gap


  8 mmol/L


(5-15) 


  6 mmol/L


(5-15)


 


Blood Urea Nitrogen


  15 mg/dL


(7-18) 


  14 mg/dL


(7-18)


 


Creatinine


  0.7 MG/DL


(0.55-1.30) 


  0.7 MG/DL


(0.55-1.30)


 


Estimat Glomerular Filtration


Rate  mL/min (>60)  


  


   mL/min (>60)  


 


 


Glucose Level


  116 MG/DL


()  H 


  122 MG/DL


()  H


 


Lactic Acid Level


  0.90 mmol/L


(0.4-2.0) 


  


 


 


Calcium Level


  9.6 MG/DL


(8.5-10.1) 


  8.6 MG/DL


(8.5-10.1)


 


Total Bilirubin


  0.6 MG/DL


(0.2-1.0) 


  0.7 MG/DL


(0.2-1.0)


 


Aspartate Amino Transf


(AST/SGOT) 14 U/L (15-37)


L 


  18 U/L (15-37)


 


 


Alanine Aminotransferase


(ALT/SGPT) 20 U/L (12-78)


  


  13 U/L (12-78)


 


 


Alkaline Phosphatase


  63 U/L


() 


  62 U/L


()


 


Ammonia


  18 umol/L


(11-32) 


  


 


 


Troponin I


  0.003 ng/mL


(0.000-0.056) 


  


 


 


Total Protein


  6.9 G/DL


(6.4-8.2) 


  7.0 G/DL


(6.4-8.2)


 


Albumin


  3.7 G/DL


(3.4-5.0) 


  3.5 G/DL


(3.4-5.0)


 


Globulin 3.2 g/dL    3.5 g/dL  


 


Albumin/Globulin Ratio 1.2 (1.0-2.7)    1.0 (1.0-2.7)  


 


Thyroid Stimulating Hormone


(TSH) 4.465 uiU/mL


(0.358-3.740) 


  


 


 


Salicylates Level


  0.7 ug/mL


(2.8-20)  L 


  0.2 ug/mL


(2.8-20)  L


 


Acetaminophen Level


  < 2 MCG/ML


(10-30)  L 


  < 2 MCG/ML


(10-30)  L


 


Serum Alcohol < 3 mg/dL    


 


Urine Color  Pale yellow   


 


Urine Appearance  Clear   


 


Urine pH  7 (4.5-8.0)   


 


Urine Specific Gravity


  


  1.010


(1.005-1.035) 


 


 


Urine Protein


  


  Negative


(NEGATIVE) 


 


 


Urine Glucose (UA)


  


  Negative


(NEGATIVE) 


 


 


Urine Ketones


  


  1+ (NEGATIVE)


H 


 


 


Urine Occult Blood


  


  1+ (NEGATIVE)


H 


 


 


Urine Nitrite


  


  Negative


(NEGATIVE) 


 


 


Urine Bilirubin


  


  Negative


(NEGATIVE) 


 


 


Urine Urobilinogen


  


  Normal MG/DL


(0.0-1.0) 


 


 


Urine Leukocyte Esterase


  


  Negative


(NEGATIVE) 


 


 


Urine RBC


  


  0-2 /HPF (0 -


2) 


 


 


Urine WBC


  


  0 /HPF (0 - 2)


  


 


 


Urine Squamous Epithelial


Cells 


  Few /LPF


(NONE/OCC) 


 


 


Urine Bacteria


  


  None /HPF


(NONE) 


 


 


Urine Opiates Screen


  


  Negative


(NEGATIVE) 


 


 


Urine Barbiturates Screen


  


  Negative


(NEGATIVE) 


 


 


Phencyclidine (PCP) Screen


  


  Negative


(NEGATIVE) 


 


 


Urine Amphetamines Screen


  


  Negative


(NEGATIVE) 


 


 


Urine Benzodiazepines Screen


  


  Negative


(NEGATIVE) 


 


 


Urine Cocaine Screen


  


  Negative


(NEGATIVE) 


 


 


Urine Marijuana (THC) Screen


  


  Negative


(NEGATIVE) 


 


 


Magnesium Level


  


  


  1.5 MG/DL


(1.8-2.4)  L








Height (Feet):  5


Height (Inches):  2.00


Weight (Pounds):  139


Medications





Current Medications








 Medications


  (Trade)  Dose


 Ordered  Sig/Waqar


 Route


 PRN Reason  Start Time


 Stop Time Status Last Admin


Dose Admin


 


 Acetaminophen


  (Tylenol)  500 mg  Q4H  PRN


 ORAL


 Mild Pain (Pain Scale 1-3)  8/17/18 08:30


 9/16/18 08:29  8/17/18 09:57


 


 


 Hydrochlorothiazide


  (Hydrodiuril)  12.5 mg  DAILY


 ORAL


   8/18/18 09:00


 9/17/18 08:59   


 


 


 Hydrochlorothiazide


  (Hydrodiuril)  12.5 mg  ONCE


 ORAL


   8/17/18 15:00


 8/17/18 16:30   


 


 


 Levothyroxine


 Sodium


  (Synthroid)  125 mcg  DAILY@0630


 ORAL


   8/18/18 06:30


 9/17/18 06:29   


 


 


 Losartan Potassium


  (Cozaar)  25 mg  DAILY


 ORAL


   8/18/18 09:00


 9/17/18 08:59   


 


 


 Losartan Potassium


  (Cozaar)  25 mg  ONCE


 ORAL


   8/17/18 13:30


 8/17/18 15:30  8/17/18 13:39


 


 


 Propranolol HCl


  (Inderal)  20 mg  DAILY@1500


 ORAL


   8/17/18 15:00


 9/16/18 14:59   


 


 


 Sodium Chloride  1,000 ml @ 


 75 mls/hr  [id number]


 IV


   8/17/18 08:00


 9/16/18 07:59  8/17/18 09:22


 











Assessment/Plan


Problem List:  


(1) Toxic metabolic encephalopathy


ICD Codes:  G92 - Toxic encephalopathy


SNOMED:  657723286


(2) Hyponatremia


ICD Codes:  E87.1 - Hypo-osmolality and hyponatremia


SNOMED:  27031221


(3) Hypomagnesemia


ICD Codes:  E83.42 - Hypomagnesemia


SNOMED:  194300176


(4) Hypokalemia


ICD Codes:  E87.6 - Hypokalemia


SNOMED:  36180816


(5) Hypertension


ICD Codes:  I10 - Essential (primary) hypertension


SNOMED:  28549607


(6) Hypothyroidism


ICD Codes:  E03.9 - Hypothyroidism, unspecified


SNOMED:  20798865


(7) Medication overdose


ICD Codes:  T50.901A - Poisoning by unspecified drugs, medicaments and 

biological substances, accidental (unintentional), initial encounter


SNOMED:  61188630


(8) Incomplete right bundle branch block


ICD Codes:  I45.10 - Unspecified right bundle-branch block


SNOMED:  611242319


Status:  stable, progressing


Assessment/Plan


- Admit to observation


- Psychiatry consulted


- Continue 5150 hold


- Replete electrolytes prn. 


- Monitor BMP and Mg


- Continue home BP meds and synthroid 


- 1:1 sitter at bedside 





Stable for transfer from [facility] to med-surg. 





Patient continues to exhibit suicidal ideations with a plan and is not safe for 

discharge home. Patient will require inpatient psychiatric hospitalization and 

is medically cleared for transfer. Patient is agreeable for voluntary admission 

to inpatient psychiatric facility. /case management consults 

placed to assist in placement. 





DVT ppx: HSQ


Code Status: Full


Dispo: Inpatient psychiatric facility. Patient currently lives at home alone





I spent 72 minutes on this patient's case, and 42 minutes were dedicated to 

counseling and/or care coordination. Discussed with patient/family, nursing 

staff, FLORENTIN/EMA, and psychiatrist regarding clinical status, treatment course, and 

disposition planning.











Ana Rosa Smith NP Aug 17, 2018 13:51

## 2018-08-18 VITALS — SYSTOLIC BLOOD PRESSURE: 158 MMHG | DIASTOLIC BLOOD PRESSURE: 85 MMHG

## 2018-08-18 VITALS — SYSTOLIC BLOOD PRESSURE: 119 MMHG | DIASTOLIC BLOOD PRESSURE: 73 MMHG

## 2018-08-18 VITALS — SYSTOLIC BLOOD PRESSURE: 186 MMHG | DIASTOLIC BLOOD PRESSURE: 90 MMHG

## 2018-08-18 VITALS — DIASTOLIC BLOOD PRESSURE: 93 MMHG | SYSTOLIC BLOOD PRESSURE: 154 MMHG

## 2018-08-18 VITALS — DIASTOLIC BLOOD PRESSURE: 88 MMHG | SYSTOLIC BLOOD PRESSURE: 143 MMHG

## 2018-08-18 VITALS — DIASTOLIC BLOOD PRESSURE: 94 MMHG | SYSTOLIC BLOOD PRESSURE: 184 MMHG

## 2018-08-18 LAB
ADD MANUAL DIFF: NO
ANION GAP SERPL CALC-SCNC: 8 MMOL/L (ref 5–15)
BASOPHILS NFR BLD AUTO: 1.2 % (ref 0–2)
BUN SERPL-MCNC: 12 MG/DL (ref 7–18)
CALCIUM SERPL-MCNC: 9.1 MG/DL (ref 8.5–10.1)
CHLORIDE SERPL-SCNC: 95 MMOL/L (ref 98–107)
CO2 SERPL-SCNC: 29 MMOL/L (ref 21–32)
CREAT SERPL-MCNC: 0.8 MG/DL (ref 0.55–1.3)
EOSINOPHIL NFR BLD AUTO: 1.4 % (ref 0–3)
ERYTHROCYTE [DISTWIDTH] IN BLOOD BY AUTOMATED COUNT: 11.9 % (ref 11.6–14.8)
HCT VFR BLD CALC: 36.7 % (ref 37–47)
HGB BLD-MCNC: 12.6 G/DL (ref 12–16)
LYMPHOCYTES NFR BLD AUTO: 20.8 % (ref 20–45)
MCV RBC AUTO: 86 FL (ref 80–99)
MONOCYTES NFR BLD AUTO: 9 % (ref 1–10)
NEUTROPHILS NFR BLD AUTO: 67.6 % (ref 45–75)
PLATELET # BLD: 197 K/UL (ref 150–450)
POTASSIUM SERPL-SCNC: 2.8 MMOL/L (ref 3.5–5.1)
RBC # BLD AUTO: 4.28 M/UL (ref 4.2–5.4)
SODIUM SERPL-SCNC: 132 MMOL/L (ref 136–145)
WBC # BLD AUTO: 6.8 K/UL (ref 4.8–10.8)

## 2018-08-18 RX ADMIN — PROPRANOLOL HYDROCHLORIDE SCH MG: 10 TABLET ORAL at 14:22

## 2018-08-18 RX ADMIN — LOSARTAN POTASSIUM SCH MG: 25 TABLET, FILM COATED ORAL at 08:50

## 2018-08-18 RX ADMIN — LEVOTHYROXINE SODIUM SCH MCG: 125 TABLET ORAL at 06:22

## 2018-08-18 RX ADMIN — HYDRALAZINE HYDROCHLORIDE PRN MG: 25 TABLET ORAL at 01:09

## 2018-08-18 RX ADMIN — ASPIRIN 81 MG SCH MG: 81 TABLET ORAL at 08:49

## 2018-08-18 RX ADMIN — HYDROCHLOROTHIAZIDE SCH MG: 50 TABLET ORAL at 08:49

## 2018-08-18 RX ADMIN — DOCUSATE SODIUM SCH MG: 100 CAPSULE, LIQUID FILLED ORAL at 08:50

## 2018-08-18 NOTE — GENERAL PROGRESS NOTE
Assessment/Plan


Problem List:  


(1) Toxic metabolic encephalopathy


ICD Codes:  G92 - Toxic encephalopathy


SNOMED:  033502028


(2) Hyponatremia


ICD Codes:  E87.1 - Hypo-osmolality and hyponatremia


SNOMED:  49097559


(3) Hypomagnesemia


ICD Codes:  E83.42 - Hypomagnesemia


SNOMED:  516899441


(4) Hypokalemia


ICD Codes:  E87.6 - Hypokalemia


SNOMED:  34231140


(5) Hypertension


ICD Codes:  I10 - Essential (primary) hypertension


SNOMED:  88665184


(6) Hypothyroidism


ICD Codes:  E03.9 - Hypothyroidism, unspecified


SNOMED:  25939118


(7) Medication overdose


ICD Codes:  T50.901A - Poisoning by unspecified drugs, medicaments and 

biological substances, accidental (unintentional), initial encounter


SNOMED:  94792273


(8) Incomplete right bundle branch block


ICD Codes:  I45.10 - Unspecified right bundle-branch block


SNOMED:  239739842


Status:  stable, progressing


Assessment/Plan


- Psychiatry consulted, appreciate rec's 


- No need for 5150 hold at this time as patient is agreeable to go to inpatient 

psychiatric facility 


- Replete electrolytes prn. 


- Monitor BMP and Mg


- Continue home BP meds and synthroid 


- 1:1 sitter at bedside, place room close to nursing station


- repeat EKG to assess for prolonged QT or other abnormalities given ambien 

overdose 


- start lexapro 10mg qam for depression 


- start remeron 15mg qhs for insomnia . 


- avoid ambien 





PCP: Dr. Yoandy Bales 





Patient continues to exhibit suicidal ideations with a plan and is not safe for 

discharge home. Patient will require inpatient psychiatric hospitalization and 

is medically cleared for transfer. Patient is agreeable for voluntary admission 

to inpatient psychiatric facility. /case management consults 

placed to assist in placement. 





DVT ppx: HSQ


Code Status: Full


Dispo: Inpatient psychiatric facility. Patient currently lives at home alone





I spent 32 minutes on this patient's case, and 22 minutes were dedicated to 

counseling and/or care coordination. Discussed with patient/family, nursing 

staff, SW/CM, and psychiatrist regarding clinical status, treatment course, and 

disposition planning.





Subjective


Date patient seen:  Aug 18, 2018


Allergies:  


Coded Allergies:  


     CODEINE (Verified  Allergy, Unknown, 10/30/09)


Subjective


- seen by psychiatry


- patient requesting to go home stating that she would not do this again 


- AF, HDS. respiratory status stable





Objective





Last 24 Hour Vital Signs








  Date Time  Temp Pulse Resp B/P (MAP) Pulse Ox O2 Delivery O2 Flow Rate FiO2


 


8/18/18 14:22  79  143/88    


 


8/18/18 12:00      Room Air  


 


8/18/18 11:50 97.5 79 20 143/88 (106) 94   





 97.5       


 


8/18/18 08:50    154/93    


 


8/18/18 08:15      Room Air  


 


8/18/18 08:00 97.0 75 20 154/93 (113) 96   





 97.0       


 


8/18/18 06:22    186/90    


 


8/18/18 04:56  61  186/90    


 


8/18/18 04:00      Room Air  


 


8/18/18 04:00 97.1 65 19 184/94 (124) 93   





 97.1       


 


8/18/18 02:47  61      


 


8/18/18 01:09    186/90    


 


8/18/18 00:00 96.6 67 19 186/90 (122) 97   





 96.6       


 


8/18/18 00:00      Room Air  


 


8/17/18 20:00      Room Air  


 


8/17/18 20:00 97.8 65 18 144/84 (104) 94   





 97.8       


 


8/17/18 17:07  74      


 


8/17/18 16:21  144  80/73    

















Intake and Output  


 


 8/17/18 8/18/18





 19:00 07:00


 


Intake Total 600 ml 1150 ml


 


Balance 600 ml 1150 ml


 


  


 


Intake Oral 600 ml 400 ml


 


IV Total  750 ml


 


# Voids 3 4


 


# Bowel Movements  2








Laboratory Tests


8/18/18 04:05: 


White Blood Count 6.8, Red Blood Count 4.28, Hemoglobin 12.6, Hematocrit 36.7L, 

Mean Corpuscular Volume 86, Mean Corpuscular Hemoglobin 29.4, Mean Corpuscular 

Hemoglobin Concent 34.3, Red Cell Distribution Width 11.9, Platelet Count 197, 

Mean Platelet Volume 9.1, Neutrophils (%) (Auto) 67.6, Lymphocytes (%) (Auto) 

20.8, Monocytes (%) (Auto) 9.0, Eosinophils (%) (Auto) 1.4, Basophils (%) (Auto

) 1.2, Sodium Level 132L, Potassium Level 2.8L, Chloride Level 95L, Carbon 

Dioxide Level 29, Anion Gap 8, Blood Urea Nitrogen 12, Creatinine 0.8, Estimat 

Glomerular Filtration Rate , Glucose Level 105, Calcium Level 9.1, Magnesium 

Level 1.7L, Troponin I 0.011


Height (Feet):  5


Height (Inches):  2.00


Weight (Pounds):  139


General Appearance:  no apparent distress, alert


EENT:  PERRL/EOMI, normal ENT inspection


Neck:  non-tender, normal alignment, supple


Cardiovascular:  normal peripheral pulses, normal rate, regular rhythm


Respiratory/Chest:  chest wall non-tender, lungs clear, normal breath sounds


Abdomen:  normal bowel sounds, non tender, soft


Extremities:  normal range of motion, non-tender


Neurologic:  CNs II-XII grossly normal, no motor/sensory deficits, alert, 

oriented x 3


Skin:  normal pigmentation, warm/dry











Ana Rosa Smith NP Aug 18, 2018 16:14

## 2018-08-18 NOTE — CONSULTATION
History of Present Illness


General


Date patient seen:  Aug 18, 2018


Chief Complaint:  Altered Level of Consciousness





Present Illness


HPI


89-year-old female with hx of depression, panic attack and one suicide attempt 

prior to this she was  brought in by [facility] after increased altered 

mental status. the pt has a sa after she came back from her ophthalmologist who 

told her sight is not improving. the pt stated that she is not suicide 

currently and stated that she does not want to lie in bed for another two days


Allergies:  


Coded Allergies:  


     CODEINE (Verified  Allergy, Unknown, 10/30/09)





Medication History


Scheduled


Aspirin* (Aspir 81*), 81 MG ORAL DAILY, (Reported)





Miscellaneous Medications


Hydrochlorothiazide* (Hydrochlorothiazide*), MG ORAL, (Reported)


Levothyroxine Sodium* (Levothyroxine Sodium*), MCG ORAL, (Reported)


Losartan Potassium* (Losartan Potassium*), MG ORAL, (Reported)


Propranolol Hcl (Propranolol Hcl), MG PO, (Reported)


Simvastatin (Zocor), MG ORAL, (Reported)


Zolpidem Tartrate* (Zolpidem Tartrate*), MG ORAL, (Reported)





Patient History


Limited by:  medical condition


History Provided By:  Patient, Medical Record, PMD


Healthcare decision maker





Resuscitation status


Full Code


Advanced Directive on File








Past Medical/Surgical History


Past Medical/Surgical History:  


(1) Altered level of consciousness


(2) Incomplete right bundle branch block


(3) Hypokalemia


(4) Hypomagnesemia


(5) Hyponatremia


(6) Hypothyroidism


(7) Hypertension


(8) Toxic metabolic encephalopathy


(9) Medication overdose





Review of Systems


Psychiatric:  Reports: anxiety, depressed feelings, SI





Physical Exam


General Appearance:  no apparent distress, alert


Neurologic:  oriented x 3, responsive, depressed affect





Last 24 Hour Vital Signs








  Date Time  Temp Pulse Resp B/P (MAP) Pulse Ox O2 Delivery O2 Flow Rate FiO2


 


8/18/18 14:22  79  143/88    


 


8/18/18 12:00      Room Air  


 


8/18/18 11:50 97.5 79 20 143/88 (106) 94   





 97.5       


 


8/18/18 08:50    154/93    


 


8/18/18 08:15      Room Air  


 


8/18/18 08:00 97.0 75 20 154/93 (113) 96   





 97.0       


 


8/18/18 06:22    186/90    


 


8/18/18 04:56  61  186/90    


 


8/18/18 04:00      Room Air  


 


8/18/18 04:00 97.1 65 19 184/94 (124) 93   





 97.1       


 


8/18/18 02:47  61      


 


8/18/18 01:09    186/90    


 


8/18/18 00:00 96.6 67 19 186/90 (122) 97   





 96.6       


 


8/18/18 00:00      Room Air  


 


8/17/18 20:00      Room Air  


 


8/17/18 20:00 97.8 65 18 144/84 (104) 94   





 97.8       


 


8/17/18 17:07  74      


 


8/17/18 16:21  144  80/73    


 


8/17/18 16:00 97.3 77 20 143/80 (101) 95   





 97.3       


 


8/17/18 16:00      Room Air  

















Intake and Output  


 


 8/17/18 8/18/18





 19:00 07:00


 


Intake Total 600 ml 1150 ml


 


Balance 600 ml 1150 ml


 


  


 


Intake Oral 600 ml 400 ml


 


IV Total  750 ml


 


# Voids 3 4


 


# Bowel Movements  2











Laboratory Tests








Test


  8/18/18


04:05


 


White Blood Count


  6.8 K/UL


(4.8-10.8)


 


Red Blood Count


  4.28 M/UL


(4.20-5.40)


 


Hemoglobin


  12.6 G/DL


(12.0-16.0)


 


Hematocrit


  36.7 %


(37.0-47.0)  L


 


Mean Corpuscular Volume 86 FL (80-99)  


 


Mean Corpuscular Hemoglobin


  29.4 PG


(27.0-31.0)


 


Mean Corpuscular Hemoglobin


Concent 34.3 G/DL


(32.0-36.0)


 


Red Cell Distribution Width


  11.9 %


(11.6-14.8)


 


Platelet Count


  197 K/UL


(150-450)


 


Mean Platelet Volume


  9.1 FL


(6.5-10.1)


 


Neutrophils (%) (Auto)


  67.6 %


(45.0-75.0)


 


Lymphocytes (%) (Auto)


  20.8 %


(20.0-45.0)


 


Monocytes (%) (Auto)


  9.0 %


(1.0-10.0)


 


Eosinophils (%) (Auto)


  1.4 %


(0.0-3.0)


 


Basophils (%) (Auto)


  1.2 %


(0.0-2.0)


 


Sodium Level


  132 MMOL/L


(136-145)  L


 


Potassium Level


  2.8 MMOL/L


(3.5-5.1)  L


 


Chloride Level


  95 MMOL/L


()  L


 


Carbon Dioxide Level


  29 MMOL/L


(21-32)


 


Anion Gap


  8 mmol/L


(5-15)


 


Blood Urea Nitrogen


  12 mg/dL


(7-18)


 


Creatinine


  0.8 MG/DL


(0.55-1.30)


 


Estimat Glomerular Filtration


Rate  mL/min (>60)  


 


 


Glucose Level


  105 MG/DL


()


 


Calcium Level


  9.1 MG/DL


(8.5-10.1)


 


Magnesium Level


  1.7 MG/DL


(1.8-2.4)  L


 


Troponin I


  0.011 ng/mL


(0.000-0.056)








Height (Feet):  5


Height (Inches):  2.00


Weight (Pounds):  139


Medications





Current Medications








 Medications


  (Trade)  Dose


 Ordered  Sig/Waqar


 Route


 PRN Reason  Start Time


 Stop Time Status Last Admin


Dose Admin


 


 Acetaminophen


  (Tylenol)  500 mg  Q4H  PRN


 ORAL


 Mild Pain (Pain Scale 1-3)  8/17/18 21:03


 9/16/18 21:02   


 


 


 Amlodipine


 Besylate


  (Norvasc)  5 mg  Q24HRS


 ORAL


   8/18/18 04:45


 9/17/18 04:32  8/18/18 04:56


 


 


 Aspirin


  (ASA)  81 mg  DAILY


 ORAL


   8/18/18 09:00


 9/17/18 08:59  8/18/18 08:49


 


 


 Atorvastatin


 Calcium


  (Lipitor)  10 mg  BEDTIME


 ORAL


   8/17/18 21:00


 9/16/18 20:59  8/17/18 21:04


 


 


 Docusate Sodium


  (Colace)  100 mg  DAILY


 ORAL


   8/18/18 09:00


 9/17/18 08:59  8/18/18 08:50


 


 


 Hydralazine HCl


  (Apresoline)  25 mg  Q6HR  PRN


 ORAL


 For High Blood Pressure  8/18/18 00:45


 9/17/18 00:44  8/18/18 01:09


 


 


 Hydralazine HCl


  (Apresoline)  25 mg  STAT


 ORAL


   8/18/18 05:30


 9/17/18 05:29  8/18/18 06:22


 


 


 Hydrochlorothiazide


  (Hydrodiuril)  12.5 mg  DAILY


 ORAL


   8/18/18 09:00


 9/17/18 08:59  8/18/18 08:49


 


 


 Levothyroxine


 Sodium


  (Synthroid)  125 mcg  DAILY@0630


 ORAL


   8/18/18 06:30


 9/17/18 06:29  8/18/18 06:22


 


 


 Losartan Potassium


  (Cozaar)  25 mg  DAILY


 ORAL


   8/18/18 09:00


 9/17/18 08:59  8/18/18 08:50


 


 


 Propranolol HCl


  (Inderal)  20 mg  DAILY@1500


 ORAL


   8/18/18 15:00


 9/16/18 14:59  8/18/18 14:22


 


 


 Sodium Chloride  1,000 ml @ 


 75 mls/hr  [id number]


 IV


   8/17/18 22:00


 9/16/18 21:59  8/18/18 11:20


 











Assessment/Plan


Assessment/Plan


MDD


Panic attack


dts





-recommend psych hospitalization


-the pt has a sitter


-Lexapro 10mg qam


-notified the internist dr. Portillo who is the prescriber


-Remeron 7.5mg qhs











Bailey Servin MD Aug 18, 2018 15:58

## 2018-08-19 VITALS — DIASTOLIC BLOOD PRESSURE: 84 MMHG | SYSTOLIC BLOOD PRESSURE: 155 MMHG

## 2018-08-19 VITALS — SYSTOLIC BLOOD PRESSURE: 136 MMHG | DIASTOLIC BLOOD PRESSURE: 75 MMHG

## 2018-08-19 VITALS — DIASTOLIC BLOOD PRESSURE: 77 MMHG | SYSTOLIC BLOOD PRESSURE: 147 MMHG

## 2018-08-19 VITALS — DIASTOLIC BLOOD PRESSURE: 93 MMHG | SYSTOLIC BLOOD PRESSURE: 154 MMHG

## 2018-08-19 VITALS — SYSTOLIC BLOOD PRESSURE: 127 MMHG | DIASTOLIC BLOOD PRESSURE: 69 MMHG

## 2018-08-19 VITALS — SYSTOLIC BLOOD PRESSURE: 188 MMHG | DIASTOLIC BLOOD PRESSURE: 93 MMHG

## 2018-08-19 LAB
ADD MANUAL DIFF: NO
ANION GAP SERPL CALC-SCNC: 9 MMOL/L (ref 5–15)
BASOPHILS NFR BLD AUTO: 1 % (ref 0–2)
BUN SERPL-MCNC: 13 MG/DL (ref 7–18)
CALCIUM SERPL-MCNC: 9.2 MG/DL (ref 8.5–10.1)
CHLORIDE SERPL-SCNC: 95 MMOL/L (ref 98–107)
CO2 SERPL-SCNC: 25 MMOL/L (ref 21–32)
CREAT SERPL-MCNC: 0.7 MG/DL (ref 0.55–1.3)
EOSINOPHIL NFR BLD AUTO: 1.6 % (ref 0–3)
ERYTHROCYTE [DISTWIDTH] IN BLOOD BY AUTOMATED COUNT: 11.8 % (ref 11.6–14.8)
HCT VFR BLD CALC: 40.8 % (ref 37–47)
HGB BLD-MCNC: 14.2 G/DL (ref 12–16)
LYMPHOCYTES NFR BLD AUTO: 16.3 % (ref 20–45)
MCV RBC AUTO: 86 FL (ref 80–99)
MONOCYTES NFR BLD AUTO: 8.5 % (ref 1–10)
NEUTROPHILS NFR BLD AUTO: 72.6 % (ref 45–75)
PLATELET # BLD: 224 K/UL (ref 150–450)
POTASSIUM SERPL-SCNC: 3.5 MMOL/L (ref 3.5–5.1)
RBC # BLD AUTO: 4.76 M/UL (ref 4.2–5.4)
SODIUM SERPL-SCNC: 129 MMOL/L (ref 136–145)
WBC # BLD AUTO: 7.3 K/UL (ref 4.8–10.8)

## 2018-08-19 RX ADMIN — HYDROCHLOROTHIAZIDE SCH MG: 50 TABLET ORAL at 08:08

## 2018-08-19 RX ADMIN — DOCUSATE SODIUM SCH MG: 100 CAPSULE, LIQUID FILLED ORAL at 08:09

## 2018-08-19 RX ADMIN — LOSARTAN POTASSIUM SCH MG: 25 TABLET, FILM COATED ORAL at 08:10

## 2018-08-19 RX ADMIN — ASPIRIN 81 MG SCH MG: 81 TABLET ORAL at 08:09

## 2018-08-19 RX ADMIN — LEVOTHYROXINE SODIUM SCH MCG: 125 TABLET ORAL at 05:49

## 2018-08-19 RX ADMIN — HYDRALAZINE HYDROCHLORIDE PRN MG: 25 TABLET ORAL at 05:48

## 2018-08-19 RX ADMIN — PROPRANOLOL HYDROCHLORIDE SCH MG: 10 TABLET ORAL at 15:37

## 2018-08-19 NOTE — GENERAL PROGRESS NOTE
Assessment/Plan


Problem List:  


(1) Toxic metabolic encephalopathy


ICD Codes:  G92 - Toxic encephalopathy


SNOMED:  599926131


(2) Hyponatremia


ICD Codes:  E87.1 - Hypo-osmolality and hyponatremia


SNOMED:  20910753


(3) Hypomagnesemia


ICD Codes:  E83.42 - Hypomagnesemia


SNOMED:  304263053


(4) Hypokalemia


ICD Codes:  E87.6 - Hypokalemia


SNOMED:  90828932


(5) Hypertension


ICD Codes:  I10 - Essential (primary) hypertension


SNOMED:  65124617


(6) Hypothyroidism


ICD Codes:  E03.9 - Hypothyroidism, unspecified


SNOMED:  21687791


(7) Medication overdose


ICD Codes:  T50.901A - Poisoning by unspecified drugs, medicaments and 

biological substances, accidental (unintentional), initial encounter


SNOMED:  33922709


(8) Incomplete right bundle branch block


ICD Codes:  I45.10 - Unspecified right bundle-branch block


SNOMED:  581033521


Status:  stable, progressing


Assessment/Plan


- Psychiatry consulted, appreciate rec's 


- No need for 5150 hold at this time as patient is agreeable to go to inpatient 

psychiatric facility 


- Replete electrolytes prn. 


- Monitor BMP and Mg


- Continue home BP meds and synthroid 


- Hydralazine 25mg PO q6hr prn SBP > 160


- 1:1 sitter at bedside, place room close to nursing station


- repeat EKG to assess for prolonged QT or other abnormalities given ambien 

overdose - no acute ST or T wave changes. RBBB seen, similar to previous EKG. 

No prolonged qtc interval


- start lexapro 10mg qam for depression 


- start remeron 15mg qhs for insomnia . 


- avoid ambien 





PCP: Dr. Yoandy Bales 





Patient continues to exhibit suicidal ideations with a plan and is not safe for 

discharge home. Patient will require inpatient psychiatric hospitalization and 

is medically cleared for transfer. Patient is agreeable for voluntary admission 

to inpatient psychiatric facility. /case management consults 

placed to assist in placement. 





DVT ppx: HSQ


Code Status: Full


Dispo: Inpatient psychiatric facility. Patient currently lives at home alone





I spent 32 minutes on this patient's case, and 22 minutes were dedicated to 

counseling and/or care coordination. Discussed with patient/family, nursing 

staff, FLORENTIN/EMA, and psychiatrist regarding clinical status, treatment course, and 

disposition planning.





Subjective


Allergies:  


Coded Allergies:  


     CODEINE (Verified  Allergy, Unknown, 10/30/09)


Subjective


 BP elevated today in the 170s


denies cp, sob, n/v, abdominal pain 


pending psych placement


AM labs pending





Objective





Last 24 Hour Vital Signs








  Date Time  Temp Pulse Resp B/P (MAP) Pulse Ox O2 Delivery O2 Flow Rate FiO2


 


8/19/18 12:00 97.5 64 19 147/77 (100) 100   





 97.5       


 


8/19/18 09:00      Room Air  


 


8/19/18 08:10    178/91    


 


8/19/18 08:00 97.3 69 21 127/69 (88) 97   





 97.3       


 


8/19/18 05:48    178/91    


 


8/19/18 04:05  65  188/93    


 


8/19/18 04:00 97.0 66 18 188/93 (124) 99   





 97.0       


 


8/19/18 00:58 97.7 69 18 155/84 (107) 96   





 97.7       


 


8/18/18 21:28      Room Air  


 


8/18/18 20:00 96.8 60 18 119/73 (88) 94   





 96.8       


 


8/18/18 16:00 97.5 67 18 158/85 (109) 96   





 97.5       


 


8/18/18 14:22  79  143/88    

















Intake and Output  


 


 8/18/18 8/19/18





 19:00 07:00


 


Intake Total 1005 ml 600 ml


 


Output Total 850 ml 


 


Balance 155 ml 600 ml


 


  


 


Intake Oral 180 ml 


 


IV Total 825 ml 600 ml


 


Output Urine Total 850 ml 


 


# Bowel Movements 1 








Laboratory Tests


8/19/18 12:20: 


White Blood Count [Pending], Red Blood Count [Pending], Hemoglobin [Pending], 

Hematocrit [Pending], Mean Corpuscular Volume [Pending], Mean Corpuscular 

Hemoglobin [Pending], Mean Corpuscular Hemoglobin Concent [Pending], Red Cell 

Distribution Width [Pending], Platelet Count [Pending], Mean Platelet Volume [

Pending], Neutrophils (%) (Auto) [Pending], Lymphocytes (%) (Auto) [Pending], 

Monocytes (%) (Auto) [Pending], Eosinophils (%) (Auto) [Pending], Basophils (%) 

(Auto) [Pending], Sodium Level [Pending], Potassium Level [Pending], Chloride 

Level [Pending], Carbon Dioxide Level [Pending], Blood Urea Nitrogen [Pending], 

Creatinine [Pending], Estimat Glomerular Filtration Rate [Pending], Glucose 

Level [Pending], Calcium Level [Pending], Magnesium Level [Pending]


Height (Feet):  5


Height (Inches):  2.00


Weight (Pounds):  139


General Appearance:  no apparent distress, alert


EENT:  PERRL/EOMI, normal ENT inspection


Neck:  non-tender, normal alignment, supple


Cardiovascular:  normal peripheral pulses, normal rate, regular rhythm


Respiratory/Chest:  chest wall non-tender, lungs clear, normal breath sounds


Abdomen:  normal bowel sounds, non tender, soft


Neurologic:  CNs II-XII grossly normal, no motor/sensory deficits, alert, 

oriented x 3


Skin:  normal pigmentation, warm/dry











Ana Rosa Smith NP Aug 19, 2018 12:38

## 2018-08-20 VITALS — SYSTOLIC BLOOD PRESSURE: 173 MMHG | DIASTOLIC BLOOD PRESSURE: 96 MMHG

## 2018-08-20 VITALS — DIASTOLIC BLOOD PRESSURE: 69 MMHG | SYSTOLIC BLOOD PRESSURE: 136 MMHG

## 2018-08-20 VITALS — SYSTOLIC BLOOD PRESSURE: 147 MMHG | DIASTOLIC BLOOD PRESSURE: 79 MMHG

## 2018-08-20 VITALS — SYSTOLIC BLOOD PRESSURE: 141 MMHG | DIASTOLIC BLOOD PRESSURE: 83 MMHG

## 2018-08-20 RX ADMIN — LOSARTAN POTASSIUM SCH MG: 25 TABLET, FILM COATED ORAL at 08:56

## 2018-08-20 RX ADMIN — ASPIRIN 81 MG SCH MG: 81 TABLET ORAL at 08:56

## 2018-08-20 RX ADMIN — DOCUSATE SODIUM SCH MG: 100 CAPSULE, LIQUID FILLED ORAL at 08:56

## 2018-08-20 RX ADMIN — LEVOTHYROXINE SODIUM SCH MCG: 125 TABLET ORAL at 05:55

## 2018-08-20 RX ADMIN — HYDROCHLOROTHIAZIDE SCH MG: 50 TABLET ORAL at 08:56

## 2018-08-20 NOTE — DISCHARGE INSTRUCTIONS
Discharge Instructions


Discharge Instructions


Follow up with:  Dr. Nii Arciniega MD/Return to Hospital if:  You develop chest pain,. dyspnea or dizziness


Diet:  2 GM sodium (low sodium)


Resume Normal Activity?:  Yes


Activity:  resume normal activities





For Congestive Heart Failure


Reminder


Report to your physician any weight gain of 5 pounds or more in one week.











Harsha Javed M.D. Aug 20, 2018 13:11

## 2018-08-20 NOTE — DISCHARGE SUMMARY
Discharge Summary


Hospital Course


Date of Admission


Aug 18, 2018 at 12:04


Date of Discharge


8/20/2018


Admitting Diagnosis


POSSIBLE OVERDOSE


Reason for Hospitalization:  Somnolence


ZAHRA Muniz is a 89 year old female who was admitted on Aug 18, 2018 at 12:

04 for Overdose


Consultations


Psychiatry


Pulmonary/Critical Care


Hospital Course


88 yo woman who was admitted for somnolence after possible ambien overdose


Patient states she took more than one- was not feeling well and unclear whether 

she took this to sleep or if it was a suicide attempt


No signs of toxicity n admission


Psychiatry consulted- meds and counseling started inpatient


No active suicidal ideation or plan


No inpatient need for hospitalization or a 5150 hols and patient agreed to f/u 

with psychiatry as outpatient





Discharge Medications


Changed Medications:  


Hydrochlorothiazide* (Hydrochlorothiazide*) 12.5 Mg Capsule


12.5 MG ORAL DAILY for 30 Days, CAP (Changed from:  MG)





Levothyroxine Sodium* (Levothyroxine Sodium*) 125 Mcg Tablet


125 MCG ORAL DAILY for 30 Days, TAB (Changed from:  MCG)


Take in the morning on an empty stomach, at least 30 minutes before


 food.


Losartan Potassium* (Losartan Potassium*) 25 Mg Tablet


25 MG ORAL DAILY for 30 Days, TAB (Changed from:  MG)





Propranolol Hcl (Propranolol Hcl) 60 Mg Tablet


20 MG PO DAILY for 30 Days, TAB (Changed from:  MG)





Simvastatin (Zocor) 5 Mg Tablet


5 MG ORAL DAILY for 30 Days, TAB (Changed from:  MG)





 


Continued Medications:  


Aspirin* (Aspir 81*) 81 Mg Tablet.dr


81 MG ORAL DAILY, TAB





Escitalopram Oxalate* (Lexapro*) 10 Mg Tablet


10 MG ORAL DAILY, TAB





Mirtazapine* (Mirtazapine*) 7.5 Mg Tablet


7.5 MG ORAL BEDTIME, TAB





 


Discontinued Medications:  


Zolpidem Tartrate* (Zolpidem Tartrate*) 5 Mg Tablet


MG ORAL, TAB 0 Refills











Discharge


Condition Upon Discharge:  stable


Discharge Disposition


Patient was discharged to home





I spent 35 minuted conducting and performing discharge activities on this 

patient


Discharge Diagnoses:  


(1) Hypertension


(2) Toxic metabolic encephalopathy











Harsha Javed M.D. Aug 20, 2018 13:08

## 2018-08-20 NOTE — GENERAL PROGRESS NOTE
Assessment/Plan


Status:  stable


Assessment/Plan


MDD


Panic attack


the pt is not at imminent dts/sto


she has future oriented thought process








-Lexapro 10mg qam


-Remeron 7.5mg qhs


-the pt was provided with psych referral in [place]





Subjective


Date patient seen:  Aug 20, 2018


Neurologic/Psychiatric:  Reports: anxiety


Allergies:  


Coded Allergies:  


     CODEINE (Verified  Allergy, Unknown, 10/30/09)


Subjective


the pt is not suicidal in fact she is concern about her high blood pressure. 

she is requesting to see Dr. Javed to readjust her meds before she leaves. the 

pt was taling Losartan in past





Objective





Last 24 Hour Vital Signs








  Date Time  Temp Pulse Resp B/P (MAP) Pulse Ox O2 Delivery O2 Flow Rate FiO2


 


8/20/18 12:00 97.5 71 16 173/96 (121) 97   





 97.5       


 


8/20/18 09:00      Room Air  


 


8/20/18 08:56    136/69    


 


8/20/18 08:00 96.8 77 16 136/69 (91) 95   





 96.8       


 


8/20/18 05:55  69  147/79    


 


8/20/18 04:00 97.3 69 16 147/79 (101) 97   





 97.3       


 


8/20/18 00:00 97.0 63 14 141/83 (102) 100   





 97.0       


 


8/19/18 21:00      Room Air  


 


8/19/18 20:00 97.2 58 16 136/75 (95) 98   





 97.2       


 


8/19/18 16:00 97.4 63 20 154/93 (113) 97   





 97.4       


 


8/19/18 15:37  64  147/77    

















Intake and Output  


 


 8/19/18 8/20/18





 19:00 07:00


 


Intake Total 490 ml 1300 ml


 


Balance 490 ml 1300 ml


 


  


 


Intake Oral 240 ml 400 ml


 


IV Total 250 ml 900 ml


 


# Voids 2 2


 


# Bowel Movements 2 








Height (Feet):  5


Height (Inches):  2.00


Weight (Pounds):  139


General Appearance:  no apparent distress, alert


Neurologic:  oriented x 3, responsive, normal mood/affect











Bailey Servin MD Aug 20, 2018 12:48